# Patient Record
Sex: FEMALE | Race: WHITE | Employment: FULL TIME | URBAN - METROPOLITAN AREA
[De-identification: names, ages, dates, MRNs, and addresses within clinical notes are randomized per-mention and may not be internally consistent; named-entity substitution may affect disease eponyms.]

---

## 2019-03-14 PROBLEM — N91.2 AMENORRHEA: Status: ACTIVE | Noted: 2019-03-14

## 2019-04-26 PROBLEM — E34.9 FEMALE HYPERTESTOSTERONEMIA: Status: ACTIVE | Noted: 2019-04-26

## 2019-04-26 PROBLEM — E88.81 INSULIN RESISTANCE: Status: ACTIVE | Noted: 2019-04-26

## 2021-08-19 ENCOUNTER — HOSPITAL ENCOUNTER (OUTPATIENT)
Dept: MAMMOGRAPHY | Age: 21
Discharge: HOME | End: 2021-08-19
Attending: OBSTETRICS & GYNECOLOGY
Payer: COMMERCIAL

## 2021-08-19 DIAGNOSIS — N63.21 LUMP IN UPPER OUTER QUADRANT OF LEFT BREAST: ICD-10-CM

## 2021-08-19 DIAGNOSIS — N63.23 LUMP IN LOWER OUTER QUADRANT OF LEFT BREAST: ICD-10-CM

## 2021-08-19 PROCEDURE — 76642 ULTRASOUND BREAST LIMITED: CPT

## 2022-11-07 RX ORDER — NORGESTIMATE AND ETHINYL ESTRADIOL 0.25-0.035
1 KIT ORAL DAILY
Qty: 1 PACKET | Refills: 1 | Status: SHIPPED | OUTPATIENT
Start: 2022-11-07

## 2022-11-07 NOTE — TELEPHONE ENCOUNTER
Pt called the office requesting refills on birth control pills. Pt due for annual. Annual scheduled for 12/12/22 [address] Kleber Aponte. Rx pended for Telma Lowry [employer].